# Patient Record
Sex: MALE | Race: OTHER | HISPANIC OR LATINO | URBAN - METROPOLITAN AREA
[De-identification: names, ages, dates, MRNs, and addresses within clinical notes are randomized per-mention and may not be internally consistent; named-entity substitution may affect disease eponyms.]

---

## 2022-09-19 ENCOUNTER — EMERGENCY (EMERGENCY)
Facility: HOSPITAL | Age: 37
LOS: 1 days | Discharge: HOME | End: 2022-09-19
Attending: EMERGENCY MEDICINE
Payer: COMMERCIAL

## 2022-09-19 VITALS
WEIGHT: 149.91 LBS | DIASTOLIC BLOOD PRESSURE: 75 MMHG | OXYGEN SATURATION: 95 % | HEART RATE: 72 BPM | SYSTOLIC BLOOD PRESSURE: 131 MMHG | TEMPERATURE: 98 F | RESPIRATION RATE: 18 BRPM | HEIGHT: 68 IN

## 2022-09-19 PROCEDURE — 73030 X-RAY EXAM OF SHOULDER: CPT | Mod: 26,RT,77

## 2022-09-19 PROCEDURE — 73030 X-RAY EXAM OF SHOULDER: CPT

## 2022-09-19 PROCEDURE — 99283 EMERGENCY DEPT VISIT LOW MDM: CPT | Mod: 25

## 2022-09-19 PROCEDURE — 73030 X-RAY EXAM OF SHOULDER: CPT | Mod: 26,RT

## 2022-09-19 PROCEDURE — 99284 EMERGENCY DEPT VISIT MOD MDM: CPT

## 2022-09-19 PROCEDURE — 23650 CLTX SHO DSLC W/MNPJ WO ANES: CPT | Mod: RT

## 2022-09-19 RX ORDER — LIDOCAINE HCL 20 MG/ML
20 VIAL (ML) INJECTION ONCE
Refills: 0 | Status: COMPLETED | OUTPATIENT
Start: 2022-09-19 | End: 2022-09-19

## 2022-09-19 RX ORDER — OXYCODONE AND ACETAMINOPHEN 5; 325 MG/1; MG/1
1 TABLET ORAL ONCE
Refills: 0 | Status: DISCONTINUED | OUTPATIENT
Start: 2022-09-19 | End: 2022-09-19

## 2022-09-19 RX ADMIN — OXYCODONE AND ACETAMINOPHEN 1 TABLET(S): 5; 325 TABLET ORAL at 19:29

## 2022-09-19 RX ADMIN — OXYCODONE AND ACETAMINOPHEN 1 TABLET(S): 5; 325 TABLET ORAL at 18:45

## 2022-09-19 RX ADMIN — Medication 20 MILLILITER(S): at 19:29

## 2022-09-19 NOTE — ED ADULT NURSE NOTE - CAS EDN DISCHARGE ASSESSMENT
Alert and oriented to person, place and time

## 2022-09-19 NOTE — ED PROVIDER NOTE - NSFOLLOWUPINSTRUCTIONS_ED_ALL_ED_FT
Please take ibuprofen 600mg every 6 hours as needed for pain  Please take tylenol 650mg every 6hours as needed for pain  Follow up with orthopedist in 2-3 days  Use sling until cleared by orthopedics  Return to ER for new or worsening symptoms

## 2022-09-19 NOTE — ED PROVIDER NOTE - CARE PROVIDER_API CALL
Ifeanyi Byrnes)  Orthopaedic Surgery  [address] [place] [address]  Phone: [phone]  Fax: [phone]  Follow Up Time:

## 2022-09-19 NOTE — ED ADULT TRIAGE NOTE - CHIEF COMPLAINT QUOTE
pt c/o right shoulder dislocation, a boulder fell on him at work today  A&Ox3, resp wnl, right arm in sling

## 2022-09-19 NOTE — ED PROVIDER NOTE - CLINICAL SUMMARY MEDICAL DECISION MAKING FREE TEXT BOX
pt with R shoulder dislocation s/p work injury, reduced and confirmed on repeat xray, given sling and orthopedics follow up

## 2022-09-19 NOTE — ED PROVIDER NOTE - NSICDXNOPASTSURGICALHX_GEN_ALL_ED
Consent: The patient's consent was obtained including but not limited to risks of crusting, scabbing, blistering, scarring, darker or lighter pigmentary change, recurrence, incomplete removal and infection. Number Of Freeze-Thaw Cycles: 1 freeze-thaw cycle Render Post-Care Instructions In Note?: yes Post-Care Instructions: I reviewed with the patient in detail post-care instructions. Patient is to wear sunprotection, and avoid picking at any of the treated lesions. Pt may apply Vaseline to crusted or scabbing areas. Duration Of Freeze Thaw-Cycle (Seconds): 5 Detail Level: Simple <-- Click to add NO significant Past Surgical History

## 2022-09-19 NOTE — ED PROVIDER NOTE - PATIENT PORTAL LINK FT
You can access the FollowMyHealth Patient Portal offered by [facility] by registering at the following website: [email or web address]. By joining [facility]’s FollowMyHealth portal, you will also be able to view your health information using other applications (apps) compatible with our system.

## 2022-09-19 NOTE — ED PROVIDER NOTE - ATTENDING APP SHARED VISIT CONTRIBUTION OF CARE
38 y/o M no pmhx presents to ER c/o right shoulder pain. states he was at work and a boulder fell onto shoulder. went to urgent care where they did xray said it was dislocated and sent pt to ER. denies numbness tingling weakness, head injury or LOC    + anterior dislocation here. reduced in ED. NVI. xray with reduction successful. dc with outpatient ortho follow up

## 2022-09-19 NOTE — ED PROVIDER NOTE - PHYSICAL EXAMINATION
Gen: No acute distress, non toxic  HEENT: Mucous membranes moist, pink conjunctivae, EOMI  CV: RRR, nl s1/s2.  Resp: CTAB, normal rate and effort  GI: Abdomen soft, NT, ND. No rebound, no guarding  : No CVAT  Neuro: A&O x 3, moving all 4 extremities  MSK: +right shoulder dislocated anteriorly, limited ROM, sensation intact over biceps, full ROM elbow/wrist/hand . 2+ radial pulse  Skin: No rashes. intact and perfused.

## 2022-09-19 NOTE — ED ADULT NURSE NOTE - NSFALLRSKOUTCOME_ED_ALL_ED
"Initial /70 (BP Location: Right arm, Patient Position: Chair, Cuff Size: Child)   Pulse 103   Temp 97.9  F (36.6  C) (Tympanic)   Resp 20   Ht 4' 0.5\" (1.232 m)   Wt 52 lb (23.6 kg)   SpO2 98%   BMI 15.54 kg/m   Estimated body mass index is 15.54 kg/m  as calculated from the following:    Height as of this encounter: 4' 0.5\" (1.232 m).    Weight as of this encounter: 52 lb (23.6 kg). .  Madhavi Lord CMA ([facility]) 12/27/2018 9:06 AM     " Universal Safety Interventions

## 2022-09-19 NOTE — ED PROVIDER NOTE - NS ED ATTENDING STATEMENT MOD
This was a shared visit with the STELLA. I reviewed and verified the documentation and independently performed the documented:

## 2022-09-19 NOTE — ED ADULT TRIAGE NOTE - ARRIVAL FROM
Marie is at my desk, saying she is in such pain, she can't stand it, I told her Dr. Modi is off on Thurs & Friday. She says the Hydrocodone he has her on isn't cutting it.  And wants to know what she should do,  She also sees pain management at the end of the month, they told her to take Tylenol, she says that won't help her if the Hydrocodone isn't working.  
Patient was in a lot of pain yesterday with back/sciatic pain.She did call pain management as well and has follow up end of the month. States pain is a little better today, is resting and icing.  She will see how she is doing over the weekend and update as needed    
Vaccine Information Statement(s) was given today. This has been reviewed, questions answered, and verbal consent given by Parent for injection(s) and administration of Influenza (Inactivated).    1. Does the patient have a moderate to severe fever?  No  2. Has the patient had a serious reaction to a flu shot before?   No  3. Has the patient ever had Guillian Ratcliff Syndrome within 6 weeks of a previous flu shot?  No  4. Is the patient less that 6 months of age?  No    Patient is eligible to receive the vaccine based on all questions being answered as 'No'.    Patient tolerated without incident. See immunization grid for documentation.  
Home

## 2022-09-19 NOTE — ED PROVIDER NOTE - OBJECTIVE STATEMENT
36 y/o M no pmhx presents to ER c/o right shoulder pain. states he was at work and a boulder fell onto shoulder. went to urgent care where they did xray said it was dislocated and sent pt to ER. denies numbness tingling weakness, head injury or LOC

## 2022-09-29 PROBLEM — Z00.00 ENCOUNTER FOR PREVENTIVE HEALTH EXAMINATION: Status: ACTIVE | Noted: 2022-09-29

## 2022-09-30 ENCOUNTER — APPOINTMENT (OUTPATIENT)
Dept: ORTHOPEDIC SURGERY | Facility: CLINIC | Age: 37
End: 2022-09-30

## 2022-09-30 VITALS
DIASTOLIC BLOOD PRESSURE: 70 MMHG | HEIGHT: 67 IN | HEART RATE: 81 BPM | SYSTOLIC BLOOD PRESSURE: 121 MMHG | BODY MASS INDEX: 22.76 KG/M2 | WEIGHT: 145 LBS

## 2022-09-30 PROCEDURE — 99203 OFFICE O/P NEW LOW 30 MIN: CPT

## 2022-09-30 NOTE — PHYSICAL EXAM
[de-identified] : General:\par Awake, alert, no acute distress, Patient was cooperative and appropriate during the examination.\par \par The patient is of normal weight for height and age.\par \par Walks without an antalgic gait.\par \par Full, painless range of motion of the neck and back.\par \par Exam of the bilateral lower extremities is intact and symmetric with regards to dermatologic, vascular, and neurologic exam. Bilateral lower extremity sensation is grossly intact to light touch in the DP/SP/T/S/S nerve distributions. Intact DF/PF/EHL. BIlateral lower extremities warm and well-perfused with brisk capillary refill.\par \par \par Pulmonary:\par Regular, nonlabored breathing\par \par Abdomen:\par Soft, nontender, nondistended.\par \par Lymphatic:\par No evidence of axillary lymphadenopathy\par \par Right shoulder Exam:\par Physical exam of the shoulder demonstrates normal skin without signs of skin changes or abnormalities. No erythema, warmth, or joint effusion appreciated.  \par  \par Sensation intact light touch C5-T1\par Palpable radial pulse\par Radial/ulnar/median/axillary/musculocutaneous/AIN/PIN nerves grossly intact\par  \par Range of motion:\par Forward Flexion: 110\par Abduction: 100\par External Rotation: 50\par Internal Rotation: Lower lumbar level\par  \par Palpation:\par Moderately tender to palpation over the glenohumeral joint\par Mildly tender palpation over the rotator cuff insertion on the greater tuberosity\par Not tender to palpation over the AC joint\par Mildly tender to palpation of the biceps tendon/bicipital groove\par  \par Strength testing:\par Not tested today\par  \par Special test:\par Not tested today [de-identified] : X-rays 3 views of the right shoulder taken in the hospital at [facility] on 9/19/2022 reveals a right shoulder glenohumeral joint dislocation and a postreduction film that shows a glenohumeral joint well reduced with no acute fractures

## 2022-09-30 NOTE — HISTORY OF PRESENT ILLNESS
[de-identified] : 09/30/2022 : INSA FONTANA  is a 37 year year old male who presents to the office for evaluation of his right shoulder.  He states he was at work on 9/19/2022 and a huge pile of dirt fell onto his right shoulder.  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  He states that his shoulder dislocated when the wall of dirt fell onto his arm and he had to the hospital to have it reduced.  He states for the first week after that he wore a sling but is recently discontinued the sling and started moving a little bit.  He states he still has pain and difficulty sleeping because the pain in the anterior aspect of the shoulder.  He states he has never had any dislocations prior to this incident.  He has been out of work.  He denies any numbness or tingling distally.  He has no other complaints today.  He is here for specialist opinion today.

## 2022-10-28 ENCOUNTER — APPOINTMENT (OUTPATIENT)
Dept: ORTHOPEDIC SURGERY | Facility: CLINIC | Age: 37
End: 2022-10-28

## 2022-10-28 VITALS
BODY MASS INDEX: 22.76 KG/M2 | HEIGHT: 67 IN | HEART RATE: 71 BPM | WEIGHT: 145 LBS | DIASTOLIC BLOOD PRESSURE: 83 MMHG | SYSTOLIC BLOOD PRESSURE: 125 MMHG

## 2022-10-28 DIAGNOSIS — S43.004A UNSPECIFIED DISLOCATION OF RIGHT SHOULDER JOINT, INITIAL ENCOUNTER: ICD-10-CM

## 2022-10-28 PROCEDURE — 99072 ADDL SUPL MATRL&STAF TM PHE: CPT

## 2022-10-28 PROCEDURE — 99213 OFFICE O/P EST LOW 20 MIN: CPT

## 2022-10-28 NOTE — DISCUSSION/SUMMARY
[de-identified] : Assessment: Patient is a 37-year-old male with first-time right shoulder dislocation\par \par Plan: I had a long discussion with the patient today regarding the nature of their diagnosis and treatment plan. We discussed the risks and benefits of no treatment as well as nonoperative and operative treatments.  I reviewed the x-rays with the patient today that revealed a dislocated glenohumeral joint with a subsequent postreduction film in good alignment with no acute fractures.  At this time I am recommending that he begin physical therapy for active and passive range of motion of the shoulder.  He will avoid any heavy lifting, pushing or pulling, resistance training with the right upper extremity and will follow-up in 4-6 weeks for repeat evaluation.  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  Patient discussed and reviewed with Dr. Raymundo today.\par  \par The patient verbalizes their understanding and agrees with the plan.  All questions were answered to their satisfaction.

## 2022-10-28 NOTE — PHYSICAL EXAM
[de-identified] : X-rays 3 views of the right shoulder taken in the hospital at [facility] on 9/19/2022 reveals a right shoulder glenohumeral joint dislocation and a postreduction film that shows a glenohumeral joint well reduced with no acute fractures [de-identified] : General:\par Awake, alert, no acute distress, Patient was cooperative and appropriate during the examination.\par \par The patient is of normal weight for height and age.\par \par Walks without an antalgic gait.\par \par Full, painless range of motion of the neck and back.\par \par Exam of the bilateral lower extremities is intact and symmetric with regards to dermatologic, vascular, and neurologic exam. Bilateral lower extremity sensation is grossly intact to light touch in the DP/SP/T/S/S nerve distributions. Intact DF/PF/EHL. BIlateral lower extremities warm and well-perfused with brisk capillary refill.\par \par \par Pulmonary:\par Regular, nonlabored breathing\par \par Abdomen:\par Soft, nontender, nondistended.\par \par Lymphatic:\par No evidence of axillary lymphadenopathy\par \par Right shoulder Exam:\par Physical exam of the shoulder demonstrates normal skin without signs of skin changes or abnormalities. No erythema, warmth, or joint effusion appreciated.  \par  \par Sensation intact light touch C5-T1\par Palpable radial pulse\par Radial/ulnar/median/axillary/musculocutaneous/AIN/PIN nerves grossly intact\par  \par Range of motion:\par Forward Flexion: 150\par Abduction: 120\par External Rotation: 50\par Internal Rotation: Lower lumbar level\par  \par Palpation:\par Moderately tender to palpation over the glenohumeral joint\par Mildly tender palpation over the rotator cuff insertion on the greater tuberosity\par Not tender to palpation over the AC joint\par Mildly tender to palpation of the biceps tendon/bicipital groove\par  \par Strength testing:\par Supraspinatus 5-/5\par Infraspinatus 5-/5\par Subscapularis 5/5\par  \par Special test:\par Empty can test positive\par [place] Valle test positive

## 2022-10-28 NOTE — REASON FOR VISIT
[Follow-Up Visit] : a follow-up visit for [Initial Visit] : an initial visit for [FreeTextEntry2] : right shoulder injured 9/19/22

## 2023-01-30 NOTE — DISCUSSION/SUMMARY
Airway  Performed by: Servando Gao CRNA  Authorized by: Valdez Finch DO     Final Airway Type:  Endotracheal airway  Final Endotracheal Airway*:  ETT  ETT Size (mm)*:  7.0  Cuff*:  Regular  Technique Used for Successful ETT Placement:  Direct laryngoscopy  Devices/Methods Used in Placement*:  Mask  Intubation Procedure*:  Preoxygenation, ETCO2, Atraumatic, Dentition Unchanged and Pharynx Clear  Insertion Site:  Oral  Blade Type*:  MAC  Blade Size*:  3  Measured from*:  Lips  Secured at (cm)*:  21  Placement Verified by: auscultation and capnometry    Attempts*:  1   Patient Identified, Procedure confirmed, Emergency equipment available and Safety protocols followed  Location:  OR  Urgency:  Elective  Difficult Airway: No    Indications for Airway Management:  Anesthesia  Mask Difficulty Assessment:  1 - vent by mask  Performed By:  CRNA  Anesthesiologist:  Valdez Finch DO  CRNA:  Servadno Gao CRNA  Start Time: 1/30/2023 8:42 AM     [de-identified] : Assessment: Patient is a 37-year-old male with first-time right shoulder dislocation\par \par Plan: I had a long discussion with the patient today regarding the nature of their diagnosis and treatment plan. We discussed the risks and benefits of no treatment as well as nonoperative and operative treatments.  I reviewed the x-rays with the patient today that revealed a dislocated glenohumeral joint with a subsequent postreduction film in good alignment with no acute fractures.  At this time I am recommending that he begin physical therapy for active and passive range of motion of the shoulder.  He will avoid any heavy lifting, pushing or pulling, resistance training with the right upper extremity and will follow-up in 4 weeks for repeat evaluation.  We might start him with gentle progressive strengthening at this time.  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  He will speak with his employer about filing a Worker's Compensation claim given the nature of the injury. Patient seen and examined with Dr. Raymundo today.\par  \par The patient verbalizes their understanding and agrees with the plan.  All questions were answered to their satisfaction.

## 2025-04-17 ENCOUNTER — APPOINTMENT (OUTPATIENT)
Dept: UROLOGY | Facility: CLINIC | Age: 40
End: 2025-04-17
Payer: MEDICAID

## 2025-04-17 VITALS
HEIGHT: 67 IN | DIASTOLIC BLOOD PRESSURE: 94 MMHG | BODY MASS INDEX: 29.35 KG/M2 | RESPIRATION RATE: 16 BRPM | WEIGHT: 187 LBS | SYSTOLIC BLOOD PRESSURE: 130 MMHG | OXYGEN SATURATION: 94 % | HEART RATE: 90 BPM

## 2025-04-17 DIAGNOSIS — N50.89 OTHER SPECIFIED DISORDERS OF THE MALE GENITAL ORGANS: ICD-10-CM

## 2025-04-17 PROCEDURE — 99203 OFFICE O/P NEW LOW 30 MIN: CPT

## 2025-04-29 ENCOUNTER — APPOINTMENT (OUTPATIENT)
Dept: ULTRASOUND IMAGING | Facility: CLINIC | Age: 40
End: 2025-04-29

## 2025-04-29 ENCOUNTER — OUTPATIENT (OUTPATIENT)
Dept: OUTPATIENT SERVICES | Facility: HOSPITAL | Age: 40
LOS: 1 days | End: 2025-04-29

## 2025-04-29 PROCEDURE — 93975 VASCULAR STUDY: CPT | Mod: 26

## 2025-05-22 ENCOUNTER — APPOINTMENT (OUTPATIENT)
Dept: UROLOGY | Facility: CLINIC | Age: 40
End: 2025-05-22

## 2025-06-11 NOTE — HISTORY OF PRESENT ILLNESS
[de-identified] : 10/28/2022 : NISA FONTANA  is a 37 year  old male who presents to the office for evaluation of his right shoulder from an injury that occurred at work on 9/19/2022.  He states since last office visit he has not had any physical therapy and notices his pain and range of motion has improved but he still has pain.  He did not do physical therapy because he was waiting to open his Workmen's Compensation claim.  He has been out of work due to this injury.  He denies any instability currently but states he still has a lot of pain with movement.  he is here for routine evaluation today.  Denies any numbness or tingling distally.  He has no other complaints today.\par \par 09/30/2022 : NISA FONTANA  is a 37 year year old male who presents to the office for evaluation of his right shoulder.  He states he was at work on 9/19/2022 and a huge pile of dirt fell onto his right shoulder.  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  He states that his shoulder dislocated when the wall of dirt fell onto his arm and he had to the hospital to have it reduced.  He states for the first week after that he wore a sling but is recently discontinued the sling and started moving a little bit.  He states he still has pain and difficulty sleeping because the pain in the anterior aspect of the shoulder.  He states he has never had any dislocations prior to this incident.  He has been out of work.  He denies any numbness or tingling distally.  He has no other complaints today.  He is here for specialist opinion today. None known